# Patient Record
(demographics unavailable — no encounter records)

---

## 2024-10-08 NOTE — END OF VISIT
[FreeTextEntry3] : I, Dr. Basurto, personally performed the evaluation and management (E/M) services for this established patient who presents today with (a) new problem(s)/exacerbation of (an) existing condition(s). That E/M includes conducting the clinically appropriate interval history &/or exam, assessing all new/exacerbated conditions, and establishing a new plan of care. Today, my CAT, eVestmentjoe Lutherins, was here to observe my evaluation and management service for this new problem/exacerbated condition and follow the plan of care established by me going forward

## 2024-10-08 NOTE — ASSESSMENT
[FreeTextEntry1] : 51yo M BPH/OAB Leakage improved but still bothersome -continue tamsulosin 0.4 and oxybutynin 10mg -UA, Ucx -F/U cysto/TRUS to look for outlet obstruction consider UDS prior to any outlet procedure

## 2024-10-08 NOTE — ASSESSMENT
[FreeTextEntry1] : 49yo M BPH/OAB Leakage improved but still bothersome -continue tamsulosin 0.4 and oxybutynin 10mg -UA, Ucx -F/U cysto/TRUS to look for outlet obstruction consider UDS prior to any outlet procedure

## 2024-10-08 NOTE — HISTORY OF PRESENT ILLNESS
[FreeTextEntry1] : AMADO PRADO is a 50 year M presenting on 10/08/2024 PMH: BPH/OAB w/ nocturnal enuresis, GERD  BPH/OAB/nocturnal enuresis: tamsulosin 0.4mg Added oxybutynin 10mg 8/2024. Fatigue when took tamsulosin 0.8mg in past. Some fatigue now on oxybutynin. Nocturnal enuresis improved slightly but not gone entirely. Still bothersome. Now new nocturia x1. Prior to starting medications was having daytime leakage as well. 1.5 cups coffee every morning. Intermittent fasting, no dinner. PVR to r/o retention: 268 mL  No ED concerns.  + paternal history CaP  PSA 0.2, 2/2024 0.5, 2/2023 0.45, 3/2022

## 2024-10-31 NOTE — ASSESSMENT
[FreeTextEntry1] : urethral stricture reviewed role DVIU vs urethroplasty appears to be short stricture length need for borrego x 1 week potential recurrence reviewed rare risk ED and incotninence opts to proceed with DVIU

## 2024-10-31 NOTE — HISTORY OF PRESENT ILLNESS
[FreeTextEntry1] : signficnat obstructive LUTS see attached cysto urethral stricutre near bulb failed medial therapy

## 2025-05-27 NOTE — ASSESSMENT
[FreeTextEntry1] : 51M here for follow up for LUTS with Urethral stricture s/p urethrotomy  Retention 2/2 ureteral stricture s/p urethrotomy  - UA UCX PSA - PVR to r/o retention: 15 cc REMINGTON benign today   f/u 1 year Repair Type: Graft

## 2025-05-27 NOTE — HISTORY OF PRESENT ILLNESS
[FreeTextEntry1] : 51M here for follow up for LUTS with Urethral stricture s/p urethrotomy 2/17/2025  - PVR prior to procedure: 268 cc - PVR following procedure 12 cc - was much improved from prior to procedure  - reports far improved comfort and FOS  - comfortable no concerns otherwise  - Denies: hematuria fever chills dysuria n/v/d flank pain incontinence

## 2025-05-27 NOTE — ASSESSMENT
[FreeTextEntry1] : 51M here for follow up for LUTS with Urethral stricture s/p urethrotomy  Retention 2/2 ureteral stricture s/p urethrotomy  - UA UCX PSA - PVR to r/o retention: 15 cc REMINGTON benign today   f/u 1 year